# Patient Record
Sex: MALE | Race: WHITE | NOT HISPANIC OR LATINO
[De-identification: names, ages, dates, MRNs, and addresses within clinical notes are randomized per-mention and may not be internally consistent; named-entity substitution may affect disease eponyms.]

---

## 2022-06-01 ENCOUNTER — APPOINTMENT (OUTPATIENT)
Dept: ORTHOPEDIC SURGERY | Facility: CLINIC | Age: 37
End: 2022-06-01
Payer: OTHER MISCELLANEOUS

## 2022-06-01 VITALS — HEIGHT: 78 IN | WEIGHT: 275 LBS | BODY MASS INDEX: 31.82 KG/M2

## 2022-06-01 DIAGNOSIS — Z78.9 OTHER SPECIFIED HEALTH STATUS: ICD-10-CM

## 2022-06-01 PROCEDURE — 73010 X-RAY EXAM OF SHOULDER BLADE: CPT | Mod: RT

## 2022-06-01 PROCEDURE — 99072 ADDL SUPL MATRL&STAF TM PHE: CPT

## 2022-06-01 PROCEDURE — 73030 X-RAY EXAM OF SHOULDER: CPT | Mod: RT

## 2022-06-01 PROCEDURE — 99204 OFFICE O/P NEW MOD 45 MIN: CPT

## 2022-06-01 NOTE — IMAGING
[Right] : right shoulder [FreeTextEntry1] : The AC and GH joints are OK. [FreeTextEntry5] : There is a Type II acromion.

## 2022-06-01 NOTE — PHYSICAL EXAM
[Right] : right shoulder [Left] : left shoulder [Sitting] : sitting [Moderate] : moderate [5 ___] : forward flexion 5[unfilled]/5 [5___] : external rotation 5[unfilled]/5 [] : no ecchymosis [de-identified] : There is negative belly press. [FreeTextEntry9] : There is good motion without pain. [TWNoteComboBox6] : internal rotation L1 [TWNoteComboBox4] : passive forward flexion 160 degrees [de-identified] : external rotation 60 degrees
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
## 2022-06-01 NOTE — ASSESSMENT
[FreeTextEntry1] : We reviewed the findings and his options.\par His questions were answered.\par A MDP is rx.\par PT advised.\par Should his sx persist, an MRI will be ordered.\par Injections could be considered.\par He is working [employer].\par \par Patient seen by Mitch Kumari M.D. \par Entered by Mandy Rosario acting as scribe.
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
## 2022-06-01 NOTE — WORK
[Sprain/Strain] : sprain/strain [Was the competent medical cause of the injury] : was the competent medical cause of the injury [Are consistent with the injury] : are consistent with the injury [Consistent with my objective findings] : consistent with my objective findings [Partial] : partial [FreeTextEntry1] : He is W[employer].

## 2022-06-27 ENCOUNTER — APPOINTMENT (OUTPATIENT)
Dept: ORTHOPEDIC SURGERY | Facility: CLINIC | Age: 37
End: 2022-06-27
Payer: OTHER MISCELLANEOUS

## 2022-06-27 VITALS — BODY MASS INDEX: 31.82 KG/M2 | HEIGHT: 78 IN | WEIGHT: 275 LBS

## 2022-06-27 PROCEDURE — 99214 OFFICE O/P EST MOD 30 MIN: CPT | Mod: 25

## 2022-06-27 PROCEDURE — 20611 DRAIN/INJ JOINT/BURSA W/US: CPT

## 2022-06-27 PROCEDURE — 99072 ADDL SUPL MATRL&STAF TM PHE: CPT

## 2022-06-27 NOTE — PHYSICAL EXAM
[Right] : right shoulder [Sitting] : sitting [Moderate] : moderate [5 ___] : forward flexion 5[unfilled]/5 [5___] : external rotation 5[unfilled]/5 [Left] : left shoulder [] : no ecchymosis [de-identified] : There is negative belly press. [FreeTextEntry9] : There is good motion without pain. [TWNoteComboBox6] : internal rotation L1 [TWNoteComboBox4] : passive forward flexion 160 degrees [de-identified] : external rotation 60 degrees

## 2022-06-27 NOTE — HISTORY OF PRESENT ILLNESS
[7] : 7 [Full time] : Work status: full time [] : yes [de-identified] : pt is here for WC follow up of right shoulder. pt states pain level is the same . DOI 5/25/22 [FreeTextEntry1] : right shoulder  [de-identified] : PT

## 2022-06-27 NOTE — ASSESSMENT
[FreeTextEntry1] : We reviewed his options.\par A R SA/PC/GH injection is planned.\par He will continue with PT.\par He is working.\par Should his sx persist, an MRI will be ordered.\par NSAIDs outlined.\par \par Procedure Name: Large Joint Injection / Aspiration: Depomedrol, Lidocaine and Guidance Ultrasound\par \par Large Joint Injection was performed because of pain and inflammation.\par Depomedrol: An injection of Depomedrol 40 mg , 2 cc.\par Lidocaine: An injection of Lidocaine 1 mg , 13 cc.\par \par Medication was injected in the right subacromial space/glenohumeral joint. Patient has tried OTC's including aspirin, Ibuprofen, Aleve etc or prescription NSAIDS, and/or exercises at home and/ or physical therapy without satisfactory response. After verbal consent using sterile preparation and technique. The risks, benefits, and alternatives to cortisone injection were explained in full to the patient. Risks outlined include but are not limited to infection, sepsis, bleeding, scarring, skin discoloration, temporary increase in pain, syncopal episode, failure to resolve symptoms, allergic reaction, symptom recurrence, and elevation of blood sugar in diabetics. Patient understood the risks. All questions were answered. After discussion of options, patient requested an injection. Oral informed consent was obtained and sterile prep was done of the injection site. Sterile technique was utilized for the procedure including the preparation of the solutions used for the injection. Patient tolerated the procedure well. Advised to ice the injection site this evening. Prep with betadine locally to site. Sterile technique used. Patient tolerated procedure well. Post Procedure Instructions: Patient was advised to call if redness, pain, or fever occur and apply ice for 15 min. out of every hour for the next 12-24 hours as tolerated. Patient was advised to rest the joint(s) for 3 days. Ultrasound Guidance was used for the following reasons: for precise injection in area of tear. Visualization of the needle and placement of injection was performed without complication.\par
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
## 2022-07-19 ENCOUNTER — FORM ENCOUNTER (OUTPATIENT)
Age: 37
End: 2022-07-19

## 2022-07-20 ENCOUNTER — APPOINTMENT (OUTPATIENT)
Dept: MRI IMAGING | Facility: CLINIC | Age: 37
End: 2022-07-20

## 2022-07-20 PROCEDURE — 99072 ADDL SUPL MATRL&STAF TM PHE: CPT

## 2022-07-20 PROCEDURE — 73221 MRI JOINT UPR EXTREM W/O DYE: CPT | Mod: RT

## 2022-07-25 ENCOUNTER — APPOINTMENT (OUTPATIENT)
Dept: ORTHOPEDIC SURGERY | Facility: CLINIC | Age: 37
End: 2022-07-25

## 2022-07-25 VITALS — HEIGHT: 78 IN | BODY MASS INDEX: 31.82 KG/M2 | WEIGHT: 275 LBS

## 2022-07-25 PROCEDURE — 99214 OFFICE O/P EST MOD 30 MIN: CPT

## 2022-07-25 PROCEDURE — 99072 ADDL SUPL MATRL&STAF TM PHE: CPT

## 2022-07-25 NOTE — PHYSICAL EXAM
[Right] : right shoulder [Sitting] : sitting [Moderate] : moderate [5 ___] : forward flexion 5[unfilled]/5 [5___] : external rotation 5[unfilled]/5 [Left] : left shoulder [Mild] : mild [] : no ecchymosis [de-identified] : There is negative belly press. [FreeTextEntry9] : There is good motion without pain. [TWNoteComboBox6] : internal rotation L3 [TWNoteComboBox4] : passive forward flexion 160 degrees [de-identified] : external rotation 60 degrees

## 2022-07-25 NOTE — DATA REVIEWED
[FreeTextEntry1] : Right Shoulder MRI 7/20/22:\par There is a medium anterior supraspinatus tear, read as 1.5cm. There is AC changes. There is biceps fluid.  The muscle is good. \par \par X-rays of the right shoulder is as follows: \par Shoulder Comments: The AC and GH joints are OK.\par Scapula Comments: There is a Type II acromion.

## 2022-07-25 NOTE — ASSESSMENT
[FreeTextEntry1] : .\par We reviewed the MRI findings.   We discussed treatment options, both non-operative and operative.  I do think he is a candidate for surgery.  Pain relief is a goal as well as improving function and motion.  I reviewed surgical techniques pictorially in the books that I co-edited.\par \par Interscalene anesthesia, general anesthesia and postoperative pain management were discussed.  The importance of physical therapy postoperatively, the gradual recovery and the rehabilitation program with initial driving restrictions were noted.  The use of a Cryo-Cuff by [employer] and a sling for functional recovery was reviewed.  He understands there are no guarantees.  The benefits of decreased pain, increased function and restoring anatomy were outlined.  The risks were reviewed including, but not limited to, infection, failure, bleeding, stiffness, pain, clotting, fracture, re-tear, hardware failure, deformity, functional limitation, scarring, neurovascular compromise, and narcotic use issues.  Under certain circumstances we discussed, further surgery may be indicated.\par \par He understands that 100% recovery is not expected, and the desired level of function may not be achievable.  The complicated nature of his condition, including the tear pattern, was noted.  We discussed the potential for a prolonged recovery course and the potential for this to affect his activities, which could include a work regimen.  His questions were answered.  Other opinions can be pursued, as we discussed.\par \par He does wish to proceed with surgery.  This would include a right shoulder arthroscopy, debridement, synovectomy, decompression, Posterior capsule lysis of adhesions, medium cuff repair, possible posterior capsule lysis of adhesions, possible biceps tenodesis.  We will schedule this at the earliest mutual convenient time.  ST vs LT issues noted.  NSAID uses outlined.  He will cont HEP, including sleeper stretch.\par \par Patient seen by Mitch Kumari M.D.\par Entered by Mandy Rosario acting as scribe.
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
## 2022-07-25 NOTE — HISTORY OF PRESENT ILLNESS
[3] : 3 [6] : 6 [de-identified] : pt is here today for a test follow up for his right shoulder. pt states his pain is about the same as last visit. pt states the cortisone injection only helped for about a week  [FreeTextEntry1] : right shoulder  [de-identified] : physical therapy

## 2022-08-01 ENCOUNTER — APPOINTMENT (OUTPATIENT)
Dept: ORTHOPEDIC SURGERY | Facility: CLINIC | Age: 37
End: 2022-08-01

## 2022-08-01 VITALS — HEIGHT: 78 IN | BODY MASS INDEX: 31.82 KG/M2 | WEIGHT: 275 LBS

## 2022-08-01 PROCEDURE — 99205 OFFICE O/P NEW HI 60 MIN: CPT

## 2022-08-01 PROCEDURE — 99072 ADDL SUPL MATRL&STAF TM PHE: CPT

## 2022-08-01 NOTE — PHYSICAL EXAM
[NL (0-180)] : full active forward flexion 0-180 degrees [NL (0-70)] : full internal rotation 0-70 degrees [NL (0-90)] : full external rotation 0-90 degrees [Supine] : supine [Mild] : mild [4 ___] : forward flexion 4[unfilled]/5 [4___] : abduction 4[unfilled]/5 [5___] : external rotation 5[unfilled]/5 [] : negative Deirdre [TWNoteComboBox7] : active forward flexion 170 degrees [TWNoteComboBox4] : passive forward flexion 180 degrees [de-identified] : active abduction 140 degrees

## 2022-08-01 NOTE — HISTORY OF PRESENT ILLNESS
[de-identified] : pt is here for a   fu visit  DOI 5/25/22 for the right shoulder. pt has been seeing DR. Booker. \par pt has been doing  pt   and  was not helping much. pt has limited range of motion in the right shoulder.\par pt feels discomfort in the right shoulder. 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

## 2022-08-01 NOTE — DISCUSSION/SUMMARY
[Medication Risks Reviewed] : Medication risks reviewed [Surgical risks reviewed] : Surgical risks reviewed [de-identified] : Had an extensive discussion about the arthroscopic process of repairing the rotator cuff tear and the the recovery process that will follow. The risks and benefits of surgery have been discussed. Risks include but are not limited to bleeding, infection, reaction to anesthesia, injury to blood vessels and nerves, malunion, nonunion, DVT, PE, necessity of repeat surgery, chronic pain, loss of limb and death. The patient understands the risks and agrees with the surgical plan. All questions have been answered.\par \par discussed given his young age he would need to fix it anyway so he doesn’t develop rtc arthropathy and Gh Joint arthritis\par \par discussed timing of surgey and return to work \par Discussed the risks of recurrent tears as well as risk of progression of occult or existing arthritis, avn or chondrolysis. Discussed the process of arthroscopy including risk, benefits and alternatives.  The risks include, but are not limited to infection, bleeding, injury to small nerves and blood vessels, pain, stiffness, phlebitis, DVT and need for secondary procedures.  Preoperative, intraoperative and postoperative care were discussed and outlined to the patient as well.\par

## 2022-08-01 NOTE — DATA REVIEWED
[MRI] : MRI [Right] : of the right [Shoulder] : shoulder [I independently reviewed and interpreted images and report] : I independently reviewed and interpreted images and report [FreeTextEntry1] : full thickness retracted cuff tear

## 2022-08-01 NOTE — WORK
[Partial] : partial [Does not reveal pre-existing condition(s) that may affect treatment/prognosis] : does not reveal pre-existing condition(s) that may affect treatment/prognosis [Can return to work without limitations on ______] : can return to work without limitations on [unfilled] [15+ days] : 15+ days [Patient] : patient [Can return to his/her at work activities with restrictions?] : Patient can return to his/her at work activities with restrictions [No Rx restrictions] : No Rx restrictions. [I provided the services listed above] :  I provided the services listed above. [Torn Ligament/Tendon/Muscle] : torn ligament, tendon or muscle [Was the competent medical cause of the injury] : was the competent medical cause of the injury [Are consistent with the injury] : are consistent with the injury [Consistent with my objective findings] : consistent with my objective findings [FreeTextEntry1] : good

## 2022-09-25 ENCOUNTER — NON-APPOINTMENT (OUTPATIENT)
Age: 37
End: 2022-09-25

## 2022-09-29 ENCOUNTER — APPOINTMENT (OUTPATIENT)
Age: 37
End: 2022-09-29

## 2022-09-29 PROCEDURE — 29823 SHO ARTHRS SRG XTNSV DBRDMT: CPT | Mod: 59,RT

## 2022-09-29 PROCEDURE — 29826 SHO ARTHRS SRG DECOMPRESSION: CPT | Mod: RT

## 2022-09-29 PROCEDURE — 29826 SHO ARTHRS SRG DECOMPRESSION: CPT | Mod: AS,RT

## 2022-09-29 PROCEDURE — 29827 SHO ARTHRS SRG RT8TR CUF RPR: CPT | Mod: AS,RT

## 2022-09-29 PROCEDURE — 23700 MNPJ ANES SHO JT FIXJ APRATS: CPT | Mod: AS,59,RT

## 2022-09-29 PROCEDURE — 23700 MNPJ ANES SHO JT FIXJ APRATS: CPT | Mod: 59,RT

## 2022-09-29 PROCEDURE — 29823 SHO ARTHRS SRG XTNSV DBRDMT: CPT | Mod: AS,59,RT

## 2022-09-29 PROCEDURE — 29827 SHO ARTHRS SRG RT8TR CUF RPR: CPT | Mod: RT

## 2022-10-04 ENCOUNTER — APPOINTMENT (OUTPATIENT)
Dept: ORTHOPEDIC SURGERY | Facility: CLINIC | Age: 37
End: 2022-10-04

## 2022-10-04 VITALS — HEIGHT: 78 IN | BODY MASS INDEX: 31.82 KG/M2 | WEIGHT: 275 LBS

## 2022-10-04 PROCEDURE — 99024 POSTOP FOLLOW-UP VISIT: CPT

## 2022-10-07 NOTE — DISCUSSION/SUMMARY
[Surgical risks reviewed] : Surgical risks reviewed [de-identified] : RTC repair sx in the right shoulder on 9/29/22- wound clean, dry and intact, no drainage, normal appearance, neuro and vascular exam normal, skin intact and normal healing\par Went over the op report with the patient and discussed the next post operative steps. patient should follow post op protocols and precautions to promote normal healing and recovery.\par discussed the patch that was placed during the surgery \par sutures removed and steristrips \par patient will stay in the sling and in 2 weeks he will start physical therapy \par follow up in 2 weeks

## 2022-10-07 NOTE — HISTORY OF PRESENT ILLNESS
[de-identified] : patient is here for 1st po Visit of the right shoulder. pt had RTC repair sx in the right shoulder on 9/29/22. WC DOI 5/25/22.  the pain in the right shoulder has been manageable . pt has been taking painkillers to help alleviate the pain in the right shoulder. pt has barely slept due t the pain in the right shoulder.

## 2022-10-18 ENCOUNTER — APPOINTMENT (OUTPATIENT)
Dept: ORTHOPEDIC SURGERY | Facility: CLINIC | Age: 37
End: 2022-10-18

## 2022-10-18 VITALS — BODY MASS INDEX: 31.82 KG/M2 | HEIGHT: 78 IN | WEIGHT: 275 LBS

## 2022-10-18 PROCEDURE — 99024 POSTOP FOLLOW-UP VISIT: CPT

## 2022-10-19 NOTE — HISTORY OF PRESENT ILLNESS
[de-identified] : patient is here for po visit of  the right shoulder. PT had RTC repair sx in te right shoulder on 9/29/22. WC DOI 5/25/22.  the pain in the right shoulder has been okay. pt mostly feels the pain in the right shoulder when he lays in bed. patient disregarded instructions and stopped using sling

## 2022-10-19 NOTE — DISCUSSION/SUMMARY
[Surgical risks reviewed] : Surgical risks reviewed [de-identified] : RTC 9/29 - wound clean, dry and intact, no drainage, normal appearance, neuro and vascular exam normal, skin intact and normal healing\par \par patient is two weeks out and came in with out his sling, discussed the importance of wearing the sling for 4 weeks post surgery. advised him of the risks of retearing if he does not follow post operative protocol. patient should not be doing any movement with the arm over head and needs sling for 4 weeks followed by additional 2 weeks out of house\par \par patient will start physical therapy \par follow up in 4 weeks \par

## 2022-10-19 NOTE — WORK
[Total] : total [Does not reveal pre-existing condition(s) that may affect treatment/prognosis] : does not reveal pre-existing condition(s) that may affect treatment/prognosis [Cannot return to work because ________] : cannot return to work because [unfilled] [Patient] : patient [Rx may affect patient's ability to return to work, make patient drowsy, or other issue] : Rx may affect patient's ability to return to work, make patient drowsy, or other issue. [I provided the services listed above] :  I provided the services listed above. [FreeTextEntry1] : good

## 2022-11-15 ENCOUNTER — APPOINTMENT (OUTPATIENT)
Dept: ORTHOPEDIC SURGERY | Facility: CLINIC | Age: 37
End: 2022-11-15

## 2022-11-15 VITALS — HEIGHT: 78 IN | BODY MASS INDEX: 31.82 KG/M2 | WEIGHT: 275 LBS

## 2022-11-15 PROCEDURE — 99024 POSTOP FOLLOW-UP VISIT: CPT

## 2022-11-18 NOTE — HISTORY OF PRESENT ILLNESS
[de-identified] : WC DOI 5/25/22. patient is  here for a  po visit of the right shoulder. RTC repair on 9/29/22. the pain in the right shoulder has been manageable. pt has been going top pt 3 times a week and it has been helping. the range of motion has been improving. pt is currently not working.

## 2022-12-13 ENCOUNTER — APPOINTMENT (OUTPATIENT)
Dept: ORTHOPEDIC SURGERY | Facility: CLINIC | Age: 37
End: 2022-12-13

## 2022-12-13 VITALS — BODY MASS INDEX: 31.82 KG/M2 | WEIGHT: 275 LBS | HEIGHT: 78 IN

## 2022-12-13 DIAGNOSIS — M25.811 OTHER SPECIFIED JOINT DISORDERS, RIGHT SHOULDER: ICD-10-CM

## 2022-12-13 PROCEDURE — 99024 POSTOP FOLLOW-UP VISIT: CPT

## 2022-12-13 NOTE — HISTORY OF PRESENT ILLNESS
[de-identified] : Pt is here for a post op visit from sx on 09/29/22 - right shoulder RTC repair. [facility], and finds it does assist pain. ROM has improved greatly. Pain is most prevalent with extending, and reaching up. Not taking medications. Ices and heats at PT. Patient is not currently working.  DOI: 05/25/2022.

## 2022-12-13 NOTE — DISCUSSION/SUMMARY
[Surgical risks reviewed] : Surgical risks reviewed [de-identified] : 09/29/22 - right shoulder RTC repair- almost 3 month post op- still having some pain with motion but advised him want him to take it slow due to the tear at a young age, discomfort due  \par cont with the PT and precautions- it is medically necessary for him to continue with physical therapy in order for him to properly heal post surgery and gain strength in order for him to return to work \par patient is totally disabled \par follow up in 1 month

## 2022-12-15 ENCOUNTER — NON-APPOINTMENT (OUTPATIENT)
Age: 37
End: 2022-12-15

## 2023-01-10 ENCOUNTER — APPOINTMENT (OUTPATIENT)
Dept: ORTHOPEDIC SURGERY | Facility: CLINIC | Age: 38
End: 2023-01-10
Payer: OTHER MISCELLANEOUS

## 2023-01-10 VITALS — WEIGHT: 275 LBS | HEIGHT: 78 IN | BODY MASS INDEX: 31.82 KG/M2

## 2023-01-10 PROCEDURE — 99214 OFFICE O/P EST MOD 30 MIN: CPT

## 2023-01-10 PROCEDURE — 99072 ADDL SUPL MATRL&STAF TM PHE: CPT

## 2023-01-13 NOTE — IMAGING
[de-identified] : nvi, rom  ff to 180, IR and ER limited, improving strength, no swelling or crepitus

## 2023-01-13 NOTE — HISTORY OF PRESENT ILLNESS
[de-identified] : patient is here for a follow up on the right shoulder. patient notes he is seeing improvements overall. patient claims therapy is going well and has no issues. patient is not currently working due to injury. WC DOI 5/25/22. 9/29/22 rtc repair

## 2023-01-13 NOTE — DISCUSSION/SUMMARY
[de-identified] : Cont PT for another month to get strength back, discussed use of prescriptions nsaids \par prescribed motrin 600mgs and discussed risks of side effects and timing and management of medication.  side effects can include gi ulcers and irritation as well as kidney failure and bleeding issues\par \par \par Doing well. Will revisit returning to activity with modifications. \par \par Follow up in 4-6 weeks. \par \par

## 2023-01-13 NOTE — WORK
[Total] : total [Does not reveal pre-existing condition(s) that may affect treatment/prognosis] : does not reveal pre-existing condition(s) that may affect treatment/prognosis [Cannot return to work because ________] : cannot return to work because [unfilled] [Can return to his/her at work activities with restrictions?] : Patient cannot return to his/her at work activities with restrictions [Rx may affect patient's ability to return to work, make patient drowsy, or other issue] : Rx may affect patient's ability to return to work, make patient drowsy, or other issue. [I provided the services listed above] :  I provided the services listed above. [FreeTextEntry1] : god

## 2023-01-19 ENCOUNTER — FORM ENCOUNTER (OUTPATIENT)
Age: 38
End: 2023-01-19

## 2023-03-07 ENCOUNTER — APPOINTMENT (OUTPATIENT)
Dept: ORTHOPEDIC SURGERY | Facility: CLINIC | Age: 38
End: 2023-03-07
Payer: OTHER MISCELLANEOUS

## 2023-03-07 ENCOUNTER — APPOINTMENT (OUTPATIENT)
Dept: ORTHOPEDIC SURGERY | Facility: CLINIC | Age: 38
End: 2023-03-07

## 2023-03-07 ENCOUNTER — NON-APPOINTMENT (OUTPATIENT)
Age: 38
End: 2023-03-07

## 2023-03-07 DIAGNOSIS — M75.41 IMPINGEMENT SYNDROME OF RIGHT SHOULDER: ICD-10-CM

## 2023-03-07 PROCEDURE — 99072 ADDL SUPL MATRL&STAF TM PHE: CPT

## 2023-03-07 PROCEDURE — 99214 OFFICE O/P EST MOD 30 MIN: CPT

## 2023-03-10 NOTE — PROCEDURE
[Large Joint Injection] : Large joint injection [Right] : of the right [Shoulder] : shoulder [Pain] : pain [Betadine] : betadine [Ethyl Chloride sprayed topically] : ethyl chloride sprayed topically [Sterile technique used] : sterile technique used [Call if redness, pain or fever occur] : call if redness, pain or fever occur [Apply ice for 15min out of every hour for the next 12-24 hours as tolerated] : apply ice for 15 minutes out of every hour for the next 12-24 hours as tolerated [Patient was advised to rest the joint(s) for ____ days] : patient was advised to rest the joint(s) for [unfilled] days [Previous OTC use and PT nontherapeutic] : patient has tried OTC's including aspirin, Ibuprofen, Aleve, etc or prescription NSAIDS, and/or exercises at home and/or physical therapy without satisfactory response [Risks, benefits, alternatives discussed / Verbal consent obtained] : the risks benefits, and alternatives have been discussed, and verbal consent was obtained [FreeTextEntry1] : Right shoulder 9/2 [FreeTextEntry3] : Large joint injection was performed of the right shoulder. An injection of Bupivacaine (Marcaine) cc of 0.5% was used Betamethasone (Celestone) cc of 6mg. \par Patient was advised to call if redness, pain or fever occur and apply ice for 15 minutes out of every hour for the next 12-24 hours as tolerated. \par \par Patient has tried OTC's including aspirin, Ibuprofen, Aleve, etc or prescription NSAIDS, and/or exercises at home and/or physical therapy without satisfactory response and the risks benefits, and alternatives have been discussed, and verbal consent was obtained. \par

## 2023-03-10 NOTE — PHYSICAL EXAM
[Right] : right shoulder [] : no tenderness to palpation [NL (0-180)] : full passive forward flexion 0-180 degrees

## 2023-03-10 NOTE — HISTORY OF PRESENT ILLNESS
[de-identified] : rt shoulder here for follow up  pain and clicking   has not been working,\par doing home exercises as well   ,\par had an evaristo last week,  going to therapy\par pain when reaching out to the side \par

## 2023-03-10 NOTE — DISCUSSION/SUMMARY
[Medication Risks Reviewed] : Medication risks reviewed [Surgical risks reviewed] : Surgical risks reviewed [de-identified] : Patient receive right shoulder 9/2 CSI for pain management and inflammation. Cont PT to get strength back, discussed use of prescriptions nsaids \par prescribed motrin 600mgs and discussed risks of side effects and timing and management of medication.  side effects can include gi ulcers and irritation as well as kidney failure and bleeding issues\par \par Follow up in 1 month to evaluate reponse to injection. \par

## 2023-03-10 NOTE — WORK
[Total] : total [Does not reveal pre-existing condition(s) that may affect treatment/prognosis] : does not reveal pre-existing condition(s) that may affect treatment/prognosis [Cannot return to work because ________] : cannot return to work because [unfilled] [Rx may affect patient's ability to return to work, make patient drowsy, or other issue] : Rx may affect patient's ability to return to work, make patient drowsy, or other issue. [I provided the services listed above] :  I provided the services listed above. [Can return to his/her at work activities with restrictions?] : Patient cannot return to his/her at work activities with restrictions [FreeTextEntry1] : god

## 2023-03-20 ENCOUNTER — FORM ENCOUNTER (OUTPATIENT)
Age: 38
End: 2023-03-20

## 2023-03-21 ENCOUNTER — FORM ENCOUNTER (OUTPATIENT)
Age: 38
End: 2023-03-21

## 2023-03-29 ENCOUNTER — APPOINTMENT (OUTPATIENT)
Dept: HUMAN REPRODUCTION | Facility: CLINIC | Age: 38
End: 2023-03-29
Payer: COMMERCIAL

## 2023-03-29 PROCEDURE — 89322 SEMEN ANAL STRICT CRITERIA: CPT

## 2023-04-04 ENCOUNTER — APPOINTMENT (OUTPATIENT)
Dept: ORTHOPEDIC SURGERY | Facility: CLINIC | Age: 38
End: 2023-04-04
Payer: OTHER MISCELLANEOUS

## 2023-04-04 ENCOUNTER — NON-APPOINTMENT (OUTPATIENT)
Age: 38
End: 2023-04-04

## 2023-04-04 VITALS — WEIGHT: 275 LBS | HEIGHT: 78 IN | BODY MASS INDEX: 31.82 KG/M2

## 2023-04-04 DIAGNOSIS — M75.21 BICIPITAL TENDINITIS, RIGHT SHOULDER: ICD-10-CM

## 2023-04-04 DIAGNOSIS — S46.011D STRAIN OF MUSCLE(S) AND TENDON(S) OF THE ROTATOR CUFF OF RIGHT SHOULDER, SUBSEQUENT ENCOUNTER: ICD-10-CM

## 2023-04-04 PROCEDURE — 20611 DRAIN/INJ JOINT/BURSA W/US: CPT | Mod: RT

## 2023-04-04 PROCEDURE — J3490M: CUSTOM

## 2023-04-04 PROCEDURE — 99214 OFFICE O/P EST MOD 30 MIN: CPT | Mod: 25

## 2023-04-04 RX ORDER — OXYCODONE AND ACETAMINOPHEN 10; 325 MG/1; MG/1
10-325 TABLET ORAL
Qty: 40 | Refills: 0 | Status: DISCONTINUED | COMMUNITY
Start: 2022-09-28 | End: 2023-04-04

## 2023-04-04 RX ORDER — METHYLPREDNISOLONE 4 MG/1
4 TABLET ORAL
Qty: 1 | Refills: 0 | Status: DISCONTINUED | COMMUNITY
Start: 2022-06-01 | End: 2023-04-04

## 2023-04-07 NOTE — PROCEDURE
[Other: ____] : [unfilled] [Right] : of the right [Betadine] : betadine [Ethyl Chloride sprayed topically] : ethyl chloride sprayed topically [Sterile technique used] : sterile technique used [Call if redness, pain or fever occur] : call if redness, pain or fever occur [Apply ice for 15min out of every hour for the next 12-24 hours as tolerated] : apply ice for 15 minutes out of every hour for the next 12-24 hours as tolerated [Patient was advised to rest the joint(s) for ____ days] : patient was advised to rest the joint(s) for [unfilled] days [Previous OTC use and PT nontherapeutic] : patient has tried OTC's including aspirin, Ibuprofen, Aleve, etc or prescription NSAIDS, and/or exercises at home and/or physical therapy without satisfactory response [Risks, benefits, alternatives discussed / Verbal consent obtained] : the risks benefits, and alternatives have been discussed, and verbal consent was obtained [Prior failure or difficult injection] : prior failure or difficult injection [All ultrasound images have been permanently captured and stored accordingly in our picture archiving and communication system] : All ultrasound images have been permanently captured and stored accordingly in our picture archiving and communication system [Visualization of the needle and placement of injection was performed without complication] : visualization of the needle and placement of injection was performed without complication [Pain] : pain [FreeTextEntry1] : right proximal biceps CSI 5/1

## 2023-04-07 NOTE — PHYSICAL EXAM
[Right] : right shoulder [NL (0-180)] : full active abduction 0-180 degrees [NL (0-70)] : full internal rotation 0-70 degrees [NL (0-90)] : full external rotation 0-90 degrees [Sitting] : sitting [] : no tenderness to palpation

## 2023-04-07 NOTE — HISTORY OF PRESENT ILLNESS
[de-identified] : Patient is here to follow up on right shoulder. Had RTC repair on 9/29/22. CSI from 3/07/23 gave relief for about a week. Currently [facility]x at [facility], and finds it does assist. Will notice pain when adding weight while exercising. ROM is slightly limited. Patient is currently not working. WC DOI: 5/25/22.

## 2023-04-07 NOTE — DISCUSSION/SUMMARY
[Medication Risks Reviewed] : Medication risks reviewed [Surgical risks reviewed] : Surgical risks reviewed [de-identified] : Patient reports improvement from previous subacromial injection CSI. Discussed injecting biceps today as that seems to be the root of symptoms. \par Recieved right proximal biceps injection today. \par Recommend the patient obtain MRI right shoulder to rule out recurrent tearing and biceps tendonitis. Follow up after MRI to possibly rule out surgical pathology and discuss future treatment options. \par \par The patient may gradually advance back into weight lifting in the gym - discussed importance of low weight to high rep. \par \par prescribed motrin 600mgs and discussed risks of side effects and timing and management of medication.  side effects can include gi ulcers and irritation as well as kidney failure and bleeding issues\par \par Follow up in 1 month to evaluate response to injection. \par

## 2023-04-07 NOTE — WORK
[Does not reveal pre-existing condition(s) that may affect treatment/prognosis] : does not reveal pre-existing condition(s) that may affect treatment/prognosis [Cannot return to work because ________] : cannot return to work because [unfilled] [Rx may affect patient's ability to return to work, make patient drowsy, or other issue] : Rx may affect patient's ability to return to work, make patient drowsy, or other issue. [I provided the services listed above] :  I provided the services listed above. [Can return to his/her at work activities with restrictions?] : Patient cannot return to his/her at work activities with restrictions [FreeTextEntry1] : good

## 2023-04-10 ENCOUNTER — APPOINTMENT (OUTPATIENT)
Dept: MRI IMAGING | Facility: CLINIC | Age: 38
End: 2023-04-10

## 2023-04-25 ENCOUNTER — APPOINTMENT (OUTPATIENT)
Dept: GASTROENTEROLOGY | Facility: CLINIC | Age: 38
End: 2023-04-25
Payer: COMMERCIAL

## 2023-04-25 VITALS
WEIGHT: 290 LBS | OXYGEN SATURATION: 97 % | TEMPERATURE: 98.3 F | SYSTOLIC BLOOD PRESSURE: 112 MMHG | DIASTOLIC BLOOD PRESSURE: 82 MMHG | HEIGHT: 78 IN | HEART RATE: 95 BPM | BODY MASS INDEX: 33.55 KG/M2

## 2023-04-25 DIAGNOSIS — R12 HEARTBURN: ICD-10-CM

## 2023-04-25 PROCEDURE — 99204 OFFICE O/P NEW MOD 45 MIN: CPT

## 2023-04-25 RX ORDER — FAMOTIDINE 20 MG/1
20 TABLET, FILM COATED ORAL
Qty: 60 | Refills: 2 | Status: ACTIVE | COMMUNITY
Start: 2023-04-25 | End: 1900-01-01

## 2023-04-25 NOTE — ASSESSMENT
[FreeTextEntry1] : Chronic heartburn without getting better with once daily PPI, possibilities include hiatal hernia, gallstones or inadequate acid control.\par I will do upper endoscopy, abdominal sonogram and routine labs\par I advised him to take famotidine twice daily in addition to pantoprazole\par Lifestyle changes including dietary suggestions discussed especially to avoid carbonated drinks

## 2023-04-25 NOTE — PHYSICAL EXAM
[Alert] : alert [Normal Voice/Communication] : normal voice/communication [Healthy Appearing] : healthy appearing [No Acute Distress] : no acute distress [Sclera] : the sclera and conjunctiva were normal [Hearing Threshold Finger Rub Not [place]] : hearing was normal [Normal Lips/Gums] : the lips and gums were normal [Oropharynx] : the oropharynx was normal [Normal Appearance] : the appearance of the neck was normal [No Neck Mass] : no neck mass was observed [No Respiratory Distress] : no respiratory distress [No Acc Muscle Use] : no accessory muscle use [Respiration, Rhythm And Depth] : normal respiratory rhythm and effort [Auscultation Breath Sounds / Voice Sounds] : lungs were clear to auscultation bilaterally [Heart Rate And Rhythm] : heart rate was normal and rhythm regular [Normal S1, S2] : normal S1 and S2 [Murmurs] : no murmurs [None] : no edema [Bowel Sounds] : normal bowel sounds [Abdomen Tenderness] : non-tender [No Masses] : no abdominal mass palpated [Abdomen Soft] : soft [Cervical Lymph Nodes Enlarged Posterior Bilaterally] : no posterior cervical lymphadenopathy [Supraclavicular Lymph Nodes Enlarged Bilaterally] : no supraclavicular lymphadenopathy [Cervical Lymph Nodes Enlarged Anterior Bilaterally] : no anterior cervical lymphadenopathy [No CVA Tenderness] : no CVA  tenderness [No Spinal Tenderness] : no spinal tenderness [Abnormal Walk] : normal gait [No Clubbing, Cyanosis] : no clubbing or cyanosis of the fingernails [Normal Color / Pigmentation] : normal skin color and pigmentation [] : no rash [No Focal Deficits] : no focal deficits [Oriented To Time, Place, And Person] : oriented to person, place, and time

## 2023-04-26 LAB
ALBUMIN SERPL ELPH-MCNC: 4.2 G/DL
ALP BLD-CCNC: 31 U/L
ALT SERPL-CCNC: 18 U/L
ANION GAP SERPL CALC-SCNC: 10 MMOL/L
AST SERPL-CCNC: 21 U/L
BASOPHILS # BLD AUTO: 0.04 K/UL
BASOPHILS NFR BLD AUTO: 0.4 %
BILIRUB SERPL-MCNC: 0.6 MG/DL
BUN SERPL-MCNC: 8 MG/DL
CALCIUM SERPL-MCNC: 9.3 MG/DL
CHLORIDE SERPL-SCNC: 103 MMOL/L
CO2 SERPL-SCNC: 25 MMOL/L
CREAT SERPL-MCNC: 1.11 MG/DL
EGFR: 88 ML/MIN/1.73M2
EOSINOPHIL # BLD AUTO: 0.15 K/UL
EOSINOPHIL NFR BLD AUTO: 1.5 %
GGT SERPL-CCNC: 15 U/L
GLUCOSE SERPL-MCNC: 98 MG/DL
HCT VFR BLD CALC: 47.6 %
HGB BLD-MCNC: 15.5 G/DL
IMM GRANULOCYTES NFR BLD AUTO: 0.6 %
LYMPHOCYTES # BLD AUTO: 1.2 K/UL
LYMPHOCYTES NFR BLD AUTO: 12.4 %
MAN DIFF?: NORMAL
MCHC RBC-ENTMCNC: 28.8 PG
MCHC RBC-ENTMCNC: 32.6 GM/DL
MCV RBC AUTO: 88.5 FL
MONOCYTES # BLD AUTO: 0.92 K/UL
MONOCYTES NFR BLD AUTO: 9.5 %
NEUTROPHILS # BLD AUTO: 7.32 K/UL
NEUTROPHILS NFR BLD AUTO: 75.6 %
PLATELET # BLD AUTO: 212 K/UL
POTASSIUM SERPL-SCNC: 4.1 MMOL/L
PROT SERPL-MCNC: 6.6 G/DL
RBC # BLD: 5.38 M/UL
RBC # FLD: 14.1 %
SODIUM SERPL-SCNC: 138 MMOL/L
WBC # FLD AUTO: 9.69 K/UL

## 2023-04-27 LAB
TTG IGA SER IA-ACNC: <1.2 U/ML
TTG IGA SER-ACNC: NEGATIVE

## 2023-04-28 ENCOUNTER — APPOINTMENT (OUTPATIENT)
Dept: ULTRASOUND IMAGING | Facility: IMAGING CENTER | Age: 38
End: 2023-04-28
Payer: COMMERCIAL

## 2023-04-28 ENCOUNTER — OUTPATIENT (OUTPATIENT)
Dept: OUTPATIENT SERVICES | Facility: HOSPITAL | Age: 38
LOS: 1 days | End: 2023-04-28
Payer: COMMERCIAL

## 2023-04-28 DIAGNOSIS — R12 HEARTBURN: ICD-10-CM

## 2023-04-28 PROCEDURE — 76700 US EXAM ABDOM COMPLETE: CPT

## 2023-04-28 PROCEDURE — 76700 US EXAM ABDOM COMPLETE: CPT | Mod: 26

## 2023-05-04 ENCOUNTER — APPOINTMENT (OUTPATIENT)
Dept: GASTROENTEROLOGY | Facility: AMBULATORY MEDICAL SERVICES | Age: 38
End: 2023-05-04

## 2023-05-16 ENCOUNTER — NON-APPOINTMENT (OUTPATIENT)
Age: 38
End: 2023-05-16

## 2023-05-16 ENCOUNTER — APPOINTMENT (OUTPATIENT)
Dept: ORTHOPEDIC SURGERY | Facility: CLINIC | Age: 38
End: 2023-05-16
Payer: OTHER MISCELLANEOUS

## 2023-05-16 VITALS — WEIGHT: 290 LBS | HEIGHT: 78 IN | BODY MASS INDEX: 33.55 KG/M2

## 2023-05-16 DIAGNOSIS — Z98.890 OTHER SPECIFIED POSTPROCEDURAL STATES: ICD-10-CM

## 2023-05-16 PROCEDURE — 99214 OFFICE O/P EST MOD 30 MIN: CPT

## 2023-05-22 NOTE — HISTORY OF PRESENT ILLNESS
[de-identified] : Patient is here for a test follow up on the right shoulder. Patient notes shoulder is overall better since Celestone injection. Patient does mention some bicep origin pain when lifting with biceps. Patient went to [facility] for the MRI because comp ([facility] said they wouldn’t cover anything else. MRI 5/9/23 found rtc tear and mild ac oa. Patient is not going to therapy and does it on his own. WC DOI 5/25/22 patient is not currently working but is scheduled to be back june 1st.

## 2023-05-22 NOTE — DATA REVIEWED
[MRI] : MRI [Right] : of the right [Shoulder] : shoulder [Report was reviewed and noted in the chart] : The report was reviewed and noted in the chart [FreeTextEntry1] : MRI report reveals s/p rotator cuff repair with no evidence of recurrent tearing, mild ACOA. No images available for review.

## 2023-05-22 NOTE — DISCUSSION/SUMMARY
[Medication Risks Reviewed] : Medication risks reviewed [Surgical risks reviewed] : Surgical risks reviewed [de-identified] : \par Previous biceps CSI provided significant improvement. \par Patient went to [facility] for recent MRI secondary to comp [facility] claiming they wouldn’t cover anything else.\par MRI report reveals s/p rotator cuff repair with no evidence of recurrent tearing, mild ACOA. No images available for review. \par The patient will return to work June 1, 2023\par Continue advancing activity as tolerated. \par The patient may gradually advance back into weight lifting in the gym - discussed importance of low weight to high rep. \par \par prescribed nsaids and discussed risks of side effects and timing and management of medication.  side effects can include gi ulcers and irritation as well as kidney failure and bleeding issues \par \par Follow up on a PRN basis unless new symptoms arise. \par

## 2023-05-22 NOTE — WORK
[Does not reveal pre-existing condition(s) that may affect treatment/prognosis] : does not reveal pre-existing condition(s) that may affect treatment/prognosis [Can return to work without limitations on ______] : can return to work without limitations on [unfilled] [Rx may affect patient's ability to return to work, make patient drowsy, or other issue] : Rx may affect patient's ability to return to work, make patient drowsy, or other issue. [I provided the services listed above] :  I provided the services listed above. [Can return to his/her at work activities with restrictions?] : Patient cannot return to his/her at work activities with restrictions [FreeTextEntry1] : good

## 2023-05-31 RX ORDER — PANTOPRAZOLE SODIUM 40 MG/1
40 GRANULE, DELAYED RELEASE ORAL TWICE DAILY
Qty: 60 | Refills: 2 | Status: ACTIVE | COMMUNITY
Start: 1900-01-01 | End: 1900-01-01

## 2023-06-05 ENCOUNTER — APPOINTMENT (OUTPATIENT)
Dept: GASTROENTEROLOGY | Facility: AMBULATORY MEDICAL SERVICES | Age: 38
End: 2023-06-05
Payer: COMMERCIAL

## 2023-06-05 PROCEDURE — 43239 EGD BIOPSY SINGLE/MULTIPLE: CPT

## 2023-06-28 ENCOUNTER — APPOINTMENT (OUTPATIENT)
Dept: GASTROENTEROLOGY | Facility: CLINIC | Age: 38
End: 2023-06-28
Payer: COMMERCIAL

## 2023-06-28 VITALS
OXYGEN SATURATION: 97 % | SYSTOLIC BLOOD PRESSURE: 121 MMHG | BODY MASS INDEX: 33.55 KG/M2 | TEMPERATURE: 98.3 F | DIASTOLIC BLOOD PRESSURE: 67 MMHG | WEIGHT: 290 LBS | HEART RATE: 112 BPM | HEIGHT: 78 IN

## 2023-06-28 PROCEDURE — 99213 OFFICE O/P EST LOW 20 MIN: CPT

## 2023-06-28 NOTE — PHYSICAL EXAM
[Alert] : alert [Normal Voice/Communication] : normal voice/communication [Healthy Appearing] : healthy appearing [No Acute Distress] : no acute distress [Sclera] : the sclera and conjunctiva were normal [Hearing Threshold Finger Rub Not [place]] : hearing was normal [Normal Lips/Gums] : the lips and gums were normal [Oropharynx] : the oropharynx was normal [Normal Appearance] : the appearance of the neck was normal [No Neck Mass] : no neck mass was observed [No Respiratory Distress] : no respiratory distress [No Acc Muscle Use] : no accessory muscle use [Respiration, Rhythm And Depth] : normal respiratory rhythm and effort [Auscultation Breath Sounds / Voice Sounds] : lungs were clear to auscultation bilaterally [Heart Rate And Rhythm] : heart rate was normal and rhythm regular [Normal S1, S2] : normal S1 and S2 [Murmurs] : no murmurs [None] : no edema [Bowel Sounds] : normal bowel sounds [Abdomen Tenderness] : non-tender [No Masses] : no abdominal mass palpated [Abdomen Soft] : soft [Cervical Lymph Nodes Enlarged Posterior Bilaterally] : no posterior cervical lymphadenopathy [Supraclavicular Lymph Nodes Enlarged Bilaterally] : no supraclavicular lymphadenopathy [Cervical Lymph Nodes Enlarged Anterior Bilaterally] : no anterior cervical lymphadenopathy [No CVA Tenderness] : no CVA  tenderness [No Spinal Tenderness] : no spinal tenderness [Abnormal Walk] : normal gait [No Clubbing, Cyanosis] : no clubbing or cyanosis of the fingernails [Normal Color / Pigmentation] : normal skin color and pigmentation [] : no rash [No Focal Deficits] : no focal deficits [Oriented To Time, Place, And Person] : oriented to person, place, and time

## 2023-06-28 NOTE — HISTORY OF PRESENT ILLNESS
[FreeTextEntry1] : NIKITA YADAV 37 year He   came for follow up visit.He had labs. \par Denies any NVCD or BPR.No chest pain ,cough or SOB.Good appetite and no weight loss.\par Abd pain is very inconsistent and short episodes, unrelated to any food items.\par Denies any use of NSAIDS, smoking or Excess ETOH\par He had no episodes of pain since her last visit .\par EGD and biopsies were negative for H. pylori.  It showed grade 3 esophagitis.  Patient's insurance denying prescriptions for famotidine and pantoprazole despite many appeals.\par Suggested patient to call his insurance plan in the interim advised him to buy over-the-counter.

## 2023-06-28 NOTE — ASSESSMENT
[FreeTextEntry1] : Chronic heartburn with grade 3 esophagitis on recent endoscopy.\par Advised him to continue over-the-counter famotidine at bedtime, omeprazole 40 mg in the morning\par I will prescribe esomeprazole now to see if coveed

## 2023-07-11 ENCOUNTER — APPOINTMENT (OUTPATIENT)
Dept: HUMAN REPRODUCTION | Facility: CLINIC | Age: 38
End: 2023-07-11

## 2023-08-28 NOTE — DISCUSSION/SUMMARY
Medication:   Requested Prescriptions     Pending Prescriptions Disp Refills    lisinopril-hydroCHLOROthiazide (PRINZIDE;ZESTORETIC) 10-12.5 MG per tablet [Pharmacy Med Name: LISINOPRIL-HCTZ 10-12.5 MG TAB] 30 tablet 1     Sig: TAKE 1 TABLET BY MOUTH DAILY        Last Filled: 08/14/2023 #30 with 1 refill     Patient Phone Number: [phone] (home)     Last appt: 4/11/2023   Next appt: 8/30/2023    Last OARRS: No flowsheet data found. [Surgical risks reviewed] : Surgical risks reviewed [de-identified] : RTC repair on 9/29/22- normal course with good progress and no evidence of infection, continue rehab and appropriate nsaids\par wound clean, dry and intact, well healed, neuro intact,  no warmth erythema or drainage, no evidence of infection , passive range of motion to 160, neuro and vascular intact, normal progress\par continue rehab and precautions\par follow up in 1 month

## 2023-10-29 ENCOUNTER — RX RENEWAL (OUTPATIENT)
Age: 38
End: 2023-10-29

## 2024-03-18 ENCOUNTER — RX RENEWAL (OUTPATIENT)
Age: 39
End: 2024-03-18

## 2024-03-18 RX ORDER — ESOMEPRAZOLE MAGNESIUM 40 MG/1
40 CAPSULE, DELAYED RELEASE ORAL DAILY
Qty: 30 | Refills: 3 | Status: ACTIVE | COMMUNITY
Start: 2023-06-28 | End: 1900-01-01

## 2024-07-23 ENCOUNTER — RX RENEWAL (OUTPATIENT)
Age: 39
End: 2024-07-23

## 2024-10-15 ENCOUNTER — APPOINTMENT (OUTPATIENT)
Dept: ORTHOPEDIC SURGERY | Facility: CLINIC | Age: 39
End: 2024-10-15

## 2024-10-15 VITALS — HEIGHT: 78 IN | BODY MASS INDEX: 33.55 KG/M2 | WEIGHT: 290 LBS

## 2024-10-15 PROCEDURE — 99214 OFFICE O/P EST MOD 30 MIN: CPT

## 2024-10-29 ENCOUNTER — RX RENEWAL (OUTPATIENT)
Age: 39
End: 2024-10-29

## 2025-02-27 ENCOUNTER — RX RENEWAL (OUTPATIENT)
Age: 40
End: 2025-02-27

## 2025-03-18 ENCOUNTER — APPOINTMENT (OUTPATIENT)
Dept: ORTHOPEDIC SURGERY | Facility: CLINIC | Age: 40
End: 2025-03-18
Payer: OTHER MISCELLANEOUS

## 2025-03-18 VITALS — HEIGHT: 78 IN | BODY MASS INDEX: 32.4 KG/M2 | WEIGHT: 280 LBS

## 2025-03-18 DIAGNOSIS — M75.21 BICIPITAL TENDINITIS, RIGHT SHOULDER: ICD-10-CM

## 2025-03-18 DIAGNOSIS — Z98.890 OTHER SPECIFIED POSTPROCEDURAL STATES: ICD-10-CM

## 2025-03-18 PROCEDURE — 99214 OFFICE O/P EST MOD 30 MIN: CPT

## 2025-06-17 ENCOUNTER — APPOINTMENT (OUTPATIENT)
Dept: ORTHOPEDIC SURGERY | Facility: CLINIC | Age: 40
End: 2025-06-17
Payer: COMMERCIAL

## 2025-06-17 VITALS — WEIGHT: 280 LBS | BODY MASS INDEX: 32.4 KG/M2 | HEIGHT: 78 IN

## 2025-06-17 PROBLEM — M75.102 ROTATOR CUFF TEAR, LEFT: Status: ACTIVE | Noted: 2025-06-17

## 2025-06-17 PROBLEM — M25.312 INSTABILITY OF LEFT SHOULDER JOINT: Status: ACTIVE | Noted: 2025-06-17

## 2025-06-17 PROCEDURE — 99214 OFFICE O/P EST MOD 30 MIN: CPT

## 2025-06-17 PROCEDURE — 73030 X-RAY EXAM OF SHOULDER: CPT | Mod: LT

## 2025-06-18 ENCOUNTER — APPOINTMENT (OUTPATIENT)
Dept: PAIN MANAGEMENT | Facility: CLINIC | Age: 40
End: 2025-06-18

## 2025-06-19 ENCOUNTER — APPOINTMENT (OUTPATIENT)
Dept: MRI IMAGING | Facility: CLINIC | Age: 40
End: 2025-06-19
Payer: COMMERCIAL

## 2025-06-19 PROCEDURE — 73221 MRI JOINT UPR EXTREM W/O DYE: CPT | Mod: LT

## 2025-06-24 ENCOUNTER — APPOINTMENT (OUTPATIENT)
Dept: ORTHOPEDIC SURGERY | Facility: CLINIC | Age: 40
End: 2025-06-24

## 2025-06-24 PROCEDURE — 99214 OFFICE O/P EST MOD 30 MIN: CPT

## 2025-07-01 ENCOUNTER — APPOINTMENT (OUTPATIENT)
Dept: ORTHOPEDIC SURGERY | Facility: CLINIC | Age: 40
End: 2025-07-01

## 2025-07-07 ENCOUNTER — RX RENEWAL (OUTPATIENT)
Age: 40
End: 2025-07-07

## 2025-07-09 ENCOUNTER — APPOINTMENT (OUTPATIENT)
Dept: HUMAN REPRODUCTION | Facility: CLINIC | Age: 40
End: 2025-07-09
Payer: COMMERCIAL

## 2025-07-09 PROCEDURE — 89322 SEMEN ANAL STRICT CRITERIA: CPT

## 2025-07-12 ENCOUNTER — TRANSCRIPTION ENCOUNTER (OUTPATIENT)
Age: 40
End: 2025-07-12

## 2025-08-12 ENCOUNTER — RESULT REVIEW (OUTPATIENT)
Age: 40
End: 2025-08-12

## 2025-08-19 ENCOUNTER — APPOINTMENT (OUTPATIENT)
Dept: ORTHOPEDIC SURGERY | Facility: CLINIC | Age: 40
End: 2025-08-19

## 2025-08-19 PROCEDURE — 99214 OFFICE O/P EST MOD 30 MIN: CPT
